# Patient Record
Sex: FEMALE | Race: OTHER
[De-identification: names, ages, dates, MRNs, and addresses within clinical notes are randomized per-mention and may not be internally consistent; named-entity substitution may affect disease eponyms.]

---

## 2020-08-10 ENCOUNTER — HOSPITAL ENCOUNTER (OUTPATIENT)
Dept: HOSPITAL 62 - RAD | Age: 30
End: 2020-08-10
Attending: MIDWIFE
Payer: SELF-PAY

## 2020-08-10 DIAGNOSIS — O44.02: Primary | ICD-10-CM

## 2020-08-10 DIAGNOSIS — Z3A.16: ICD-10-CM

## 2020-08-10 PROCEDURE — 76805 OB US >/= 14 WKS SNGL FETUS: CPT

## 2020-08-10 NOTE — RADIOLOGY REPORT (SQ)
EXAM DESCRIPTION:  U/S OB 14+ TRNABD 1GES W/O DOP



IMAGES COMPLETED DATE/TIME:  8/10/2020 1:46 pm



REASON FOR STUDY:  (Z34.82)ENCOUNTER FOR SUPRVSN OF NORMAL PREGNANCY, SECOND TRIMESTER Z34.82  ENCOUN
TER FOR SUPRVSN OF NORMAL PREGNANCY, SECOND TRI



COMPARISON:  None.



TECHNIQUE:  Static and Dynamic grayscale imaging performed of gravid uterus using transabdominal appr
oach.  Additional selected color Doppler and spectral images recorded.  All stored on PACS.



LIMITATIONS:  None.



FINDINGS:  FETUSES SEEN:1

EGA: 16 weeks 2 days  Calculated using BPD,FL,HC,AC documented on images. No discrepancy with clinica
l dates.

JUNIE: 1/23/2021

EFW: 149 +/-22 grams

PERCENTILE: Not applicable. Fetus less than or equal to 20 weeks gestation.

JOSE: Adequate

PLACENTA: Posterior.  Complete placenta previa.

FETAL PRESENTATION:  Variable.

FETAL ANATOMY:  No visualized anomalies.

FETAL HEART RATE: 147 beats per minute.

FOUR CHAMBER HEART: Visualized.

MATERNAL ADNEXA: Maternal ovaries not visualized.

CERVICAL LENGTH: 8.1 cm.   Closed.

OTHER: No other significant finding.



IMPRESSION:  LIVING INTRAUTERINE PREGNANCY.

ESTIMATED GESTATIONAL AGE 16 weeks 2 day

NO VISUALIZED ANOMALIES.

Complete placenta previa suggested.  Placenta is posterior.

Trimester of pregnancy: Second trimester - 13 weeks 1 day to 27 weeks 6 days.



TECHNICAL DOCUMENTATION:  JOB ID:  [id number]

 2011 [employer]- All Rights Reserved



Reading location - IP/workstation name: [email or web address]

## 2020-09-08 ENCOUNTER — HOSPITAL ENCOUNTER (OUTPATIENT)
Dept: HOSPITAL 62 - RAD | Age: 30
End: 2020-09-08
Attending: NURSE PRACTITIONER
Payer: SELF-PAY

## 2020-09-08 DIAGNOSIS — Z34.82: Primary | ICD-10-CM

## 2020-09-08 PROCEDURE — 76805 OB US >/= 14 WKS SNGL FETUS: CPT

## 2020-09-08 NOTE — RADIOLOGY REPORT (SQ)
EXAM DESCRIPTION:  U/S OB 14+ TRNABD 1GES W/O DOP



IMAGES COMPLETED DATE/TIME:  9/8/2020 2:03 pm



REASON FOR STUDY:  Z34.82 ENCOUNTER FOR SUPRVSN OF NORMAL PREGNANCY, SECOND TRIMESTER Z34.82  ENCOUNT
ER FOR SUPRVSN OF NORMAL PREGNANCY, SECOND TRI



COMPARISON:  8/10/2020



TECHNIQUE:  Static and Dynamic grayscale imaging performed of gravid uterus using transabdominal appr
oach.  Additional selected color Doppler and spectral images recorded.  All stored on PACS.



LIMITATIONS:  None.



FINDINGS:  FETUSES SEEN:1

EGA: 19 weeks 6 days  Calculated using BPD,FL,HC,AC documented on images. No discrepancy with clinica
l dates.

JUNIE: 1/27/2021

EFW: 320 grams

PERCENTILE: Not calculated

LVP: 2.9 x 2.6 cm

PLACENTA: Posterior.  Covers cervical os.  Grade 1

FETAL PRESENTATION: Variable

FETAL ANATOMY:

FETAL HEART RATE: 137 beats per minute.

FOUR CHAMBER HEART: Visualized.

THREE VESSEL CORD: Yes.

CORD INSERTION: Visualized.

KIDNEYS AND BLADDER: Visualized. Appear normal.

STOMACH: Visualized. Appears normal.

SPINE: Normal as visualized.

BRAIN AND LATERAL VENTRICLES: Visualized. Appear normal.

OTHER: No other significant finding.

MATERNAL ADNEXA: Maternal ovaries not visualized.

CERVICAL LENGTH: 3.7 cm.   Closed.

OTHER: No other significant finding.



IMPRESSION:  Living intrauterine gestation of 19 weeks 6 days.  No fetal anomalies are seen.  There i
s a posterior placenta that represents a placenta previa at this time.

Trimester of pregnancy: Second trimester - 13 weeks 1 day to 27 weeks 6 days.



TECHNICAL DOCUMENTATION:  JOB ID:  [id number]

 2011 [employer]- All Rights Reserved



Reading location - IP/workstation name: [email or web address]

## 2020-10-30 ENCOUNTER — HOSPITAL ENCOUNTER (OUTPATIENT)
Dept: HOSPITAL 62 - RAD | Age: 30
End: 2020-10-30
Attending: NURSE PRACTITIONER
Payer: SELF-PAY

## 2020-10-30 DIAGNOSIS — Z34.83: Primary | ICD-10-CM

## 2020-10-30 PROCEDURE — 76805 OB US >/= 14 WKS SNGL FETUS: CPT

## 2020-10-30 NOTE — RADIOLOGY REPORT (SQ)
EXAM DESCRIPTION:  U/S OB 14+ TRNABD 1GES W/O DOP



IMAGES COMPLETED DATE/TIME:  10/30/2020 1:26 pm



REASON FOR STUDY:  REPEAT US/PLACENTA PREVIA/GROWTH Z34.83  ENCOUNTER FOR SUPRVSN OF NORMAL PREGNANCY
, THIRD TRIM



COMPARISON:  9/8/2020



TECHNIQUE:  Limited transabdominal grayscale ultrasound for evaluation of specific requested obstetri
boris parameters.



LIMITATIONS:  None.



FINDINGS:  CERVICAL LENGTH: 4.3 cm.   Closed.

JOSE: 17.4 cm.

FHR: 149 beats per minute.

PRESENTATION: Breech.

PLACENTA: Placenta now lies 1.5 cm proximal to the cervix.  This is improved from prior study.

FETAL ANATOMY: Not assessed

OTHER: No other significant findings.



IMPRESSION:  Low lying placenta.  The previa previously described is no longer noted.

Trimester of pregnancy: Third trimester - 28 weeks to delivery.



TECHNICAL DOCUMENTATION:  JOB ID:  [id number]

 2011 [employer]- All Rights Reserved



Reading location - IP/workstation name: [email or web address]

## 2020-11-30 ENCOUNTER — HOSPITAL ENCOUNTER (OUTPATIENT)
Dept: HOSPITAL 62 - RAD | Age: 30
End: 2020-11-30
Attending: MIDWIFE
Payer: COMMERCIAL

## 2020-11-30 DIAGNOSIS — Z3A.32: ICD-10-CM

## 2020-11-30 DIAGNOSIS — Z34.83: Primary | ICD-10-CM

## 2020-11-30 PROCEDURE — 76805 OB US >/= 14 WKS SNGL FETUS: CPT

## 2020-11-30 NOTE — RADIOLOGY REPORT (SQ)
EXAM DESCRIPTION:  U/S OB 14+ TRNABD 1GES W/O DOP



IMAGES COMPLETED DATE/TIME:  11/30/2020 1:25 pm



REASON FOR STUDY:  (Z34.83) Z34.83  ENCOUNTER FOR SUPRVSN OF NORMAL PREGNANCY, THIRD TRIM



COMPARISON:  None.



TECHNIQUE:  Limited transabdominal grayscale ultrasound for evaluation of specific requested obstetri
boris parameters.



LIMITATIONS:  None.



FINDINGS:  CERVICAL LENGTH: 4.1 cm.   Closed.

JOSE: 18.9 cm.

FHR: 127 beats per minute.

PRESENTATION: Cephalic.

PLACENTA: Posterior in location.  The since tip lies 3.6 cm above the internal os.

FETAL ANATOMY: Not assessed

OTHER: Estimated gestational age is 32 weeks 3 days.



IMPRESSION:  LIMITED OBSTETRICAL ULTRASOUND WITH MEASURED PARAMETERS DELINEATED ABOVE.

Trimester of pregnancy: Third trimester - 28 weeks to delivery.



TECHNICAL DOCUMENTATION:  JOB ID:  [id number]

 2011 [employer]- All Rights Reserved



Reading location - IP/workstation name: [email or web address]

## 2021-01-04 ENCOUNTER — HOSPITAL ENCOUNTER (INPATIENT)
Dept: HOSPITAL 62 - LC | Age: 31
LOS: 3 days | Discharge: HOME | End: 2021-01-07
Attending: OBSTETRICS & GYNECOLOGY | Admitting: OBSTETRICS & GYNECOLOGY
Payer: MEDICAID

## 2021-01-04 DIAGNOSIS — Z3A.37: ICD-10-CM

## 2021-01-04 DIAGNOSIS — O42.92: ICD-10-CM

## 2021-01-04 LAB
ADD MANUAL DIFF: NO
APPEARANCE UR: (no result)
APTT PPP: YELLOW S
BACTERIA (WET MOUNT): (no result)
BARBITURATES UR QL SCN: NEGATIVE
BASOPHILS # BLD AUTO: 0 10^3/UL (ref 0–0.2)
BASOPHILS NFR BLD AUTO: 0.2 % (ref 0–2)
BILIRUB UR QL STRIP: NEGATIVE
CHLAM PCR: NOT DETECTED
EOSINOPHIL # BLD AUTO: 0 10^3/UL (ref 0–0.6)
EOSINOPHIL NFR BLD AUTO: 0.6 % (ref 0–6)
EPITHELIALS (WET MOUNT): (no result)
ERYTHROCYTE [DISTWIDTH] IN BLOOD BY AUTOMATED COUNT: 14.3 % (ref 11.5–14)
GLUCOSE UR STRIP-MCNC: NEGATIVE MG/DL
HCT VFR BLD CALC: 33 % (ref 36–47)
HGB BLD-MCNC: 11.3 G/DL (ref 12–15.5)
KETONES UR STRIP-MCNC: NEGATIVE MG/DL
LYMPHOCYTES # BLD AUTO: 1.7 10^3/UL (ref 0.5–4.7)
LYMPHOCYTES NFR BLD AUTO: 21.8 % (ref 13–45)
MCH RBC QN AUTO: 29.7 PG (ref 27–33.4)
MCHC RBC AUTO-ENTMCNC: 34.2 G/DL (ref 32–36)
MCV RBC AUTO: 87 FL (ref 80–97)
METHADONE UR QL SCN: NEGATIVE
MONOCYTES # BLD AUTO: 0.6 10^3/UL (ref 0.1–1.4)
MONOCYTES NFR BLD AUTO: 8.3 % (ref 3–13)
NEUTROPHILS # BLD AUTO: 5.3 10^3/UL (ref 1.7–8.2)
NEUTS SEG NFR BLD AUTO: 69.1 % (ref 42–78)
NITRITE UR QL STRIP: NEGATIVE
PCP UR QL SCN: NEGATIVE
PH UR STRIP: 7 [PH] (ref 5–9)
PLATELET # BLD: 239 10^3/UL (ref 150–450)
PROT UR STRIP-MCNC: NEGATIVE MG/DL
RBC # BLD AUTO: 3.8 10^6/UL (ref 3.72–5.28)
RBCS (WET MOUNT): (no result)
SP GR UR STRIP: 1
T.VAGINALIS (WET MOUNT): (no result)
TOTAL CELLS COUNTED % (AUTO): 100 %
URINE AMPHETAMINES SCREEN: NEGATIVE
URINE BENZODIAZEPINES SCREEN: NEGATIVE
URINE COCAINE SCREEN: NEGATIVE
URINE MARIJUANA (THC) SCREEN: NEGATIVE
UROBILINOGEN UR-MCNC: NEGATIVE MG/DL (ref ?–2)
WBC # BLD AUTO: 7.6 10^3/UL (ref 4–10.5)
WBCS (WET MOUNT): (no result)
YEAST (WET MOUNT): (no result)

## 2021-01-04 PROCEDURE — 87210 SMEAR WET MOUNT SALINE/INK: CPT

## 2021-01-04 PROCEDURE — 36415 COLL VENOUS BLD VENIPUNCTURE: CPT

## 2021-01-04 PROCEDURE — 84112 EVAL AMNIOTIC FLUID PROTEIN: CPT

## 2021-01-04 PROCEDURE — 86901 BLOOD TYPING SEROLOGIC RH(D): CPT

## 2021-01-04 PROCEDURE — 86592 SYPHILIS TEST NON-TREP QUAL: CPT

## 2021-01-04 PROCEDURE — 85025 COMPLETE CBC W/AUTO DIFF WBC: CPT

## 2021-01-04 PROCEDURE — 86900 BLOOD TYPING SEROLOGIC ABO: CPT

## 2021-01-04 PROCEDURE — 87591 N.GONORRHOEAE DNA AMP PROB: CPT

## 2021-01-04 PROCEDURE — 80307 DRUG TEST PRSMV CHEM ANLYZR: CPT

## 2021-01-04 PROCEDURE — 81005 URINALYSIS: CPT

## 2021-01-04 PROCEDURE — 87491 CHLMYD TRACH DNA AMP PROBE: CPT

## 2021-01-04 PROCEDURE — 85027 COMPLETE CBC AUTOMATED: CPT

## 2021-01-04 PROCEDURE — 86850 RBC ANTIBODY SCREEN: CPT

## 2021-01-04 RX ADMIN — PENICILLIN G POTASSIUM SCH MLS/HR: 5000000 POWDER, FOR SOLUTION INTRAMUSCULAR; INTRAPLEURAL; INTRATHECAL; INTRAVENOUS at 23:09

## 2021-01-04 RX ADMIN — Medication PRN MLS/HR: at 19:23

## 2021-01-04 NOTE — ADMISSION PHYSICAL
=================================================================



=================================================================

Datetime Report Generated by CPN: 2021 16:52

   

   

=================================================================

CURRENT ADMISSION

=================================================================

   

Chief Complaint:  Uterine Contractions; Suspected Ruptured Membranes

Indication for Induction:  PROM

Admit Impression :  Term, Intrauterine Pregnancy; No Active Labor;

   Ruptured Membranes

Admit Plan:  Admit to Unit; Initiate Labor Augmentation Protocol

   

=================================================================

ALLERGIES

=================================================================

   

Medication Allergies:  No

Medication Allergies:  No Known Allergies (2021)

Latex:  No Latex Allergies

   

=================================================================

OBSTETRICAL HISTORY

=================================================================

   

EDC:  2021 00:00

:  3

Para:  2

Term:  2

:  0

SAB:  0

IAB:  0

Ectopic:  0

Livin

Cesareans:  0

VBACs:  0

Multiple Births:  0

Gestational Diabetes:  Yes

Rh Sensitization:  No

Incompetent Cervix:  No

KATHI:  No

Infertility:  No

ART Treatment:  No

Uterine Anomaly:  No

IUGR:  No

Hx Previous C/S:  No

Macrosomia:  No

Hx Loss/Stillborn:  No

PIH:  No

Hx  Death:  No

Placenta Previa/Abruption:  No

Depression/PP Depression:  No

PTL/PROM:  No

Post Partum Hemorrhage:  No

Current Pregnancy Procedures:  Ultrasound; NST

Obstetrical History Comments:  G1-  2012, male @38weeks

   G2-  6/3/2016, male @38weeks

   G3- current

   

=================================================================

***SEE PRENATAL RECORDS***

=================================================================

   

Alcohol:  No

Marijuana :  No

Cocaine:  No

Other Illicit Drugs:  No

Cigarettes:  Never Smoker. [id number]

   

=================================================================

MEDICAL HISTORY

=================================================================

   

Diabetes:  Yes

Diabetes Type:  Gestational Diabetes

Blood Transfusion:  No

Pulmonary Disease (Asthma, TB):  No

Breast Disease:  Yes

Hypertension:  No

Gyn Surgery:  No

Heart Disease:  No

Hosp/Surgery:  Yes

Autoimmune Disorder:  No

Anesthetic Complications:  No

Kidney Disease:  No

Abnormal Pap Smear:  No

Neuro/Epilepsy:  No

Psychiatric Disorders:  No

Other Medical Diseases:  No

Hepatitis/Liver Disease:  No

Significant Family History:  No

Varicosities/Phlebitis:  No

Trauma/Violence :  No

Thyroid Dysfunction:  No

Medical History Comments:  L breast mass biopsy-benign 2014

   

=================================================================

INFECTIOUS HISTORY

=================================================================

   

Gonorrhea:  No

Genital Herpes:  No

Chlamydia:  No

Tuberculosis:  No

Syphilis:  No

Hepatitis:  No

HIV/AIDS Exposure:  No

Rash or Viral Illness:  No

HPV:  No

   

=================================================================

PHYSICAL EXAM

=================================================================

   

General:  Normal

HEENT:  Normal

Neurologic:  Normal

Thyroid:  Normal

Heart:  Normal

Lungs:  Normal

Breast:  Normal

Back:  Normal

Abdomen:  Normal

Genitourinary Exam:  Normal

Extremities:  Normal

DTRs:  Normal

Pelvic Type:  Adequate

Vital Signs:  Reviewed

   

=================================================================

VAGINAL EXAM

=================================================================

   

Dilatation:  1

Effacement:  60

Station:  -3

   

=================================================================

MEMBRANES

=================================================================

   

Pooling:  Negative

Membranes:  Ruptured

Amniotic Fluid Color:  Clear

   

=================================================================

FETUS A

=================================================================

   

EGA:  37.2

Monitoring:  External US

FHR- Baseline:  140

Variability:  Moderate 6-25bpm

Accelerations:  15X15

Decelerations:  None

FHR Category:  Category I

Estimated Fetal Weight (gm):  3500

Fetal Presentation:  Vertex

   

=================================================================

PLANS FOR LABOR AND DELIVERY

=================================================================

   

Labor and Delivery:  None

Pain Management:  Epidural

Feeding Preference:  Both

Benefit of Breast Feed Discussed:  Yes

Circumcision:  N/A

   

=================================================================

INFORMED CONSENT

=================================================================

   

Signature:  Electronically signed by Ana Chowdary MD (AND) on

   2021 at 16:51  with User ID: [email or web address]

## 2021-01-05 RX ADMIN — SODIUM CHLORIDE, SODIUM LACTATE, POTASSIUM CHLORIDE, AND CALCIUM CHLORIDE PRN MLS/HR: .6; .31; .03; .02 INJECTION, SOLUTION INTRAVENOUS at 11:11

## 2021-01-05 RX ADMIN — PENICILLIN G POTASSIUM SCH MLS/HR: 5000000 POWDER, FOR SOLUTION INTRAMUSCULAR; INTRAPLEURAL; INTRATHECAL; INTRAVENOUS at 02:59

## 2021-01-05 RX ADMIN — Medication SCH ML: at 21:36

## 2021-01-05 RX ADMIN — SODIUM CHLORIDE, SODIUM LACTATE, POTASSIUM CHLORIDE, AND CALCIUM CHLORIDE PRN MLS/HR: .6; .31; .03; .02 INJECTION, SOLUTION INTRAVENOUS at 01:42

## 2021-01-05 RX ADMIN — PENICILLIN G POTASSIUM SCH MLS/HR: 5000000 POWDER, FOR SOLUTION INTRAMUSCULAR; INTRAPLEURAL; INTRATHECAL; INTRAVENOUS at 10:27

## 2021-01-05 RX ADMIN — PENICILLIN G POTASSIUM SCH MLS/HR: 5000000 POWDER, FOR SOLUTION INTRAMUSCULAR; INTRAPLEURAL; INTRATHECAL; INTRAVENOUS at 14:29

## 2021-01-05 RX ADMIN — PENICILLIN G POTASSIUM SCH MLS/HR: 5000000 POWDER, FOR SOLUTION INTRAMUSCULAR; INTRAPLEURAL; INTRATHECAL; INTRAVENOUS at 06:15

## 2021-01-05 RX ADMIN — Medication PRN MLS/HR: at 10:30

## 2021-01-05 RX ADMIN — FERROUS SULFATE TAB 325 MG (65 MG ELEMENTAL FE) SCH MG: 325 (65 FE) TAB at 18:36

## 2021-01-05 RX ADMIN — DOCUSATE SODIUM SCH MG: 100 CAPSULE, LIQUID FILLED ORAL at 18:36

## 2021-01-06 LAB
ERYTHROCYTE [DISTWIDTH] IN BLOOD BY AUTOMATED COUNT: 14.1 % (ref 11.5–14)
HCT VFR BLD CALC: 34.8 % (ref 36–47)
HGB BLD-MCNC: 11.7 G/DL (ref 12–15.5)
MCH RBC QN AUTO: 28.7 PG (ref 27–33.4)
MCHC RBC AUTO-ENTMCNC: 33.7 G/DL (ref 32–36)
MCV RBC AUTO: 85 FL (ref 80–97)
PLATELET # BLD: 215 10^3/UL (ref 150–450)
RBC # BLD AUTO: 4.08 10^6/UL (ref 3.72–5.28)
WBC # BLD AUTO: 12.9 10^3/UL (ref 4–10.5)

## 2021-01-06 RX ADMIN — Medication SCH: at 14:37

## 2021-01-06 RX ADMIN — Medication SCH ML: at 21:18

## 2021-01-06 RX ADMIN — DOCUSATE SODIUM SCH MG: 100 CAPSULE, LIQUID FILLED ORAL at 09:58

## 2021-01-06 RX ADMIN — Medication SCH CAP: at 09:58

## 2021-01-06 RX ADMIN — FERROUS SULFATE TAB 325 MG (65 MG ELEMENTAL FE) SCH MG: 325 (65 FE) TAB at 17:46

## 2021-01-06 RX ADMIN — IBUPROFEN SCH MG: 800 TABLET, FILM COATED ORAL at 17:46

## 2021-01-06 RX ADMIN — SENNOSIDES, DOCUSATE SODIUM SCH EACH: 50; 8.6 TABLET, FILM COATED ORAL at 09:58

## 2021-01-06 RX ADMIN — FERROUS SULFATE TAB 325 MG (65 MG ELEMENTAL FE) SCH MG: 325 (65 FE) TAB at 09:58

## 2021-01-06 RX ADMIN — DOCUSATE SODIUM SCH MG: 100 CAPSULE, LIQUID FILLED ORAL at 17:46

## 2021-01-06 RX ADMIN — Medication SCH ML: at 05:38

## 2021-01-06 RX ADMIN — IBUPROFEN SCH MG: 800 TABLET, FILM COATED ORAL at 02:04

## 2021-01-06 RX ADMIN — IBUPROFEN SCH MG: 800 TABLET, FILM COATED ORAL at 09:58

## 2021-01-06 NOTE — PDOC PROGRESS REPORT
Subjective-OB


Progress Note for:: 21 - PP Day #1, doing well, UOB, voiding, 

breastfeeding, O+, rubella Immune





Physical Exam (OB)


Vital Signs: 


                                        











Temp Pulse Resp BP Pulse Ox


 


 97.7 F   77   18   93/57 L  97 


 


 21 09:50  21 08:42  21 08:42  21 08:42  21 07:45








                                 Intake & Output











 21





 06:59 06:59 06:59


 


Intake Total  1450 


 


Output Total  2 


 


Balance  1448 


 


Weight 69.6 kg  














- General


General Appearance: Appears well, Alert


In distress: None





- PIH/Pre-Eclampsia


Clonus: Negative


Headache: Absent


Epigastric Pain: No


Visual Changes: No





- Maternal Morbidity


59. Maternal Morbidity (serious complications experinced by the mother 

associated with labor and delivery: None of the above





- Lochia


Lochia Amount: Small 10-25 ml


Lochia Color: Rubra/Red





- Abdomen


Description: Soft


Hernia Present: No


Fundal Description: Firm, Midline


Fundal Height: u/u - u/2





- Respiratory


Respiratory Status: No respiratory distress





- Abdominal


Distension: No distension





- Genitourinary


Genitourinary Note: 





voiding





- Extremities


Upper extremity: Normal inspection


Lower extremities: Normal inspection





- Neurological


Cognition: Normal


Orientation: AAOx4





- Psychological


Associated symptoms: Normal affect, Normal mood





- Skin


Skin Temperature: Warm


Skin Moisture: Dry





Objective-Diagnostic


Laboratory: 


                                        





                                 21 06:07 





                                        











  21





  06:07


 


WBC  12.9 H


 


RBC  4.08


 


Hgb  11.7 L


 


Hct  34.8 L


 


MCV  85


 


MCH  28.7


 


MCHC  33.7


 


RDW  14.1 H


 


Plt Count  215














Assessment and Plan(PN)





- Assessment and Plan


(1)  (normal spontaneous vaginal delivery)


Is this a current diagnosis for this admission?: Yes   





(2) SROM (spontaneous rupture of membranes)


Is this a current diagnosis for this admission?: Yes   





(3) Gestational diabetes


Qualifiers: 


   Gestational diabetes mellitus control: diet-controlled   Trimester: third 

trimester   Qualified Code(s): O24.410 - Gestational diabetes mellitus in 

pregnancy, diet controlled   


Is this a current diagnosis for this admission?: Yes   





- Time Spent with Patient


Time with patient: Less than 15 minutes


Medications reviewed and adjusted accordingly: Yes





- Disposition


Anticipated Discharge Disposition: Home, Self Care


Anticipated Discharge Timeframe: within 24 hours

## 2021-01-07 VITALS — SYSTOLIC BLOOD PRESSURE: 90 MMHG | DIASTOLIC BLOOD PRESSURE: 64 MMHG

## 2021-01-07 RX ADMIN — Medication SCH CAP: at 10:06

## 2021-01-07 RX ADMIN — Medication SCH: at 13:00

## 2021-01-07 RX ADMIN — SENNOSIDES, DOCUSATE SODIUM SCH EACH: 50; 8.6 TABLET, FILM COATED ORAL at 10:06

## 2021-01-07 RX ADMIN — DOCUSATE SODIUM SCH MG: 100 CAPSULE, LIQUID FILLED ORAL at 10:06

## 2021-01-07 RX ADMIN — FERROUS SULFATE TAB 325 MG (65 MG ELEMENTAL FE) SCH MG: 325 (65 FE) TAB at 10:06

## 2021-01-07 RX ADMIN — Medication SCH ML: at 06:18

## 2021-01-07 RX ADMIN — IBUPROFEN SCH MG: 800 TABLET, FILM COATED ORAL at 01:23

## 2021-01-07 RX ADMIN — IBUPROFEN SCH MG: 800 TABLET, FILM COATED ORAL at 10:06

## 2021-01-14 NOTE — DELIVERY SUMMARY
=================================================================

Del Sum A-C

=================================================================

Datetime Report Generated by CPN: 2021 07:20

   

   

=================================================================

DELIVERY PERSONNEL

=================================================================

   

DELIVERY PERSONNEL:  [id number]

Nurse Midwife Certified::  Catalina Parikh CNM

Labor and Delivery Nurse::  Staci Chance RN

Labor and Delivery Nurse::  Mandi Aragon RN

Scrub Tech/CNA:  ST Nicholas

Additional Personnel: :  Eunice Jha RN

   

=================================================================

MATERNAL INFORMATION

=================================================================

   

Delivery Anesthesia:  Epidural

Medications After Delivery:  Pitocin 30 Units in 500ml NS/D5W

Meds After Delivery Comment:  Pitocin 30units at 334mL/hr x200mL then

   95mL/hr xrest of bag

Delivery QBL:  100

Maternal Complications:  Other

Complication Details:  Prolonged ROM

Provider Comments:   OA, RESTITUTED TO MIMI. BODY DELIVERED EASILY.

   BABY FEMALE WITH SPONTANEOUS CRY. CORD DOUBLE CLAMPED AND CUT.

   SPONTANEOUS PLACENTA INTACT WITH 3VC. MOTHER AND INFANT STABLE IN

   L_D#5. 

   

=================================================================

LABOR SUMMARY

=================================================================

   

EDC:  2021 00:00

No. Babies in Womb:  1

 Attempted:  No

Labor Anesthesia:  Epidural

   

=================================================================

LABOR INFORMATION

=================================================================

   

Onset of Labor:  2021 09:15

Complete Dilatation:  2021 12:26

Oxytocin:  Augmentation

Group B Beta Strep:  positive

Antibiotics # of Doses:  6

Antibiotics Time of Last Dose:  2021 14:29

Name of Antibiotic Given:  PCN

Steroids Given:  None

Reason Steroids Not Administered:  Not Applicable

   

=================================================================

MEMBRANES

=================================================================

   

Membranes Rupture Method:  Spontaneous

Rupture of Membranes:  2021 12:00

Length of Rupture (hr):  26.78

Amniotic Fluid Color:  Clear

Amniotic Fluid Amount:  Small

Amniotic Fluid Odor:  None

   

=================================================================

STAGES OF LABOR

=================================================================

   

Stage 1 hr:  3

Stage 1 min:  11

Stage 2 hr:  2

Stage 2 min:  21

Stage 3 hr:  0

Stage 3 min:  5

Total Time in Labor hr:  5

Total Time in Labor min:  37

   

=================================================================

VAGINAL DELIVERY

=================================================================

   

Episiotomy:  None

Laceration #1:  None

Laceration Repair:  Not Applicable

Sponge Count Correct:  N/A

Sharps Count Correct:  N/A

   

=================================================================

BABY A INFORMATION

=================================================================

   

Infant Delivery Date/Time:  2021 14:47

Method of Delivery:  Vaginal

Nurse Controlled Delivery:  No

Born in Route :  No

:  N/A

Forceps:  N/A

Vacuum Extraction:  N/A

Shoulder Dystocia :  No

   

=================================================================

PRESENTATION/POSITION BABY A

=================================================================

   

Presentation:  Cephalic

Cephalic Presentation:  Vertex

Vertex Position:  Right Occipital Anterior

Breech Presentation:  N/A

   

=================================================================

PLACENTA INFORMATION BABY A

=================================================================

   

Placenta Delivery Time :  2021 14:52

Placenta Method of Delivery:  Spontaneous

Placenta Status:  Delivered

   

=================================================================

APGAR SCORES BABY A

=================================================================

   

Heart Rate 1 min:  >100 bpm

Resp Effort 1 min:  Good Cry

Reflex Irritability 1 min:  Cough or Sneeze or Pulls Away

Muscle Tone 1 min:  Some Flexion of Extremities

Color 1 min:  Body Pink, Extremities Blue

Resuscitation Effort 1 min:  Tactile Stimulation

APGAR SCORE 1 MIN:  8

Heart Rate 5 min:  >100 bpm

Resp Effort 5 min:  Good Cry

Reflex Irritability 5 min:  Cough or Sneeze or Pulls Away

Muscle Tone 5 min:  Active Motion

Color 5 min:  Body Pink, Extremities Blue

Resuscitation Effort 5 min:  N/A

APGAR SCORE 5 MIN:  9

   

=================================================================

INFANT INFORMATION BABY A

=================================================================

   

Gestational Age at Delivery:  37.3

Gestational Status:  Early Term- 37- 38.6 Weeks

Infant Outcome :  Liveborn

Infant Condition :  Stable

Infant Sex:  Female

Infant Sex:  Female

   

=================================================================

IDENTIFICATION BABY A

=================================================================

   

Infant Verification Date/Time:  2021 15:46

ID Band Number:  [id number]

Mother's Name Verified:  Yes

Infant Medical Record Number:  [id number]

RN Verifying Infant:  M Robson

Additional Verifying Personnel:  E Aragon

   

=================================================================

WEIGHT/LENGTH BABY A

=================================================================

   

Infant Birthweight (gm):  3215

Infant Weight (lb):  7

Infant Weight (oz):  1

Infant Length (in):  20.00

Infant Length (cm):  50.80

   

=================================================================

CORD INFORMATION BABY A

=================================================================

   

No. Cord Vessels:  3 (Annotations: Data stored by [facility] on behalf of user)

Nuchal Cord :  N/A

Cord Blood Taken:  Yes-For Storage (Mom's Blood type +) (Annotations:

   Data stored by [facility] on behalf of user)

Infant Suction:  None

   

=================================================================

ASSESSMENT BABY A

=================================================================

   

Infant Complications:  None

Physical Findings at Delivery:  Caput Succedaneum; Molding of the Head

Infant Respirations:  Appears Normal

Skin to Skin:  Yes

Neonatologist/ALS Called :  No

Infant Care By:  WES Jha RN

Transferred To:  Remains with Mother

   

=================================================================

SIGNATURES

=================================================================

   

Assignment:  Aman Vega MD

Signature:  Electronically signed by Catalina Parikh CNM  on 2021 at

   15:23  with User ID: [email or web address]

:  Electronically signed by Catalina Parikh CNM  on 2021 at 15:23  with

   User ID: [email or web address]

:  I was personally available for consultation and serving as

   supervising physician for the MLP.